# Patient Record
Sex: MALE | Race: WHITE
[De-identification: names, ages, dates, MRNs, and addresses within clinical notes are randomized per-mention and may not be internally consistent; named-entity substitution may affect disease eponyms.]

---

## 2018-03-21 ENCOUNTER — HOSPITAL ENCOUNTER (EMERGENCY)
Dept: HOSPITAL 9 - MADERS | Age: 33
Discharge: HOME | End: 2018-03-21
Payer: SELF-PAY

## 2018-03-21 DIAGNOSIS — F17.210: ICD-10-CM

## 2018-03-21 DIAGNOSIS — J20.9: Primary | ICD-10-CM

## 2018-03-21 PROCEDURE — 99283 EMERGENCY DEPT VISIT LOW MDM: CPT

## 2018-08-26 ENCOUNTER — HOSPITAL ENCOUNTER (EMERGENCY)
Dept: HOSPITAL 9 - MADERS | Age: 33
Discharge: HOME | End: 2018-08-26
Payer: SELF-PAY
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
## 2018-08-26 DIAGNOSIS — F17.210: ICD-10-CM

## 2018-08-26 DIAGNOSIS — S82.61XA: Primary | ICD-10-CM
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
## 2018-10-22 ENCOUNTER — HOSPITAL ENCOUNTER (EMERGENCY)
Dept: HOSPITAL 9 - MADERS | Age: 33
Discharge: HOME | End: 2018-10-22
Payer: SELF-PAY

## 2018-10-22 DIAGNOSIS — M79.18: Primary | ICD-10-CM

## 2018-10-22 DIAGNOSIS — F17.210: ICD-10-CM

## 2018-10-22 PROCEDURE — 99281 EMR DPT VST MAYX REQ PHY/QHP: CPT

## 2018-11-14 ENCOUNTER — HOSPITAL ENCOUNTER (EMERGENCY)
Dept: HOSPITAL 9 - MADERS | Age: 33
Discharge: HOME | End: 2018-11-14
Payer: SELF-PAY

## 2018-11-14 DIAGNOSIS — F17.210: ICD-10-CM

## 2018-11-14 DIAGNOSIS — J20.9: Primary | ICD-10-CM

## 2018-11-14 PROCEDURE — 94640 AIRWAY INHALATION TREATMENT: CPT

## 2018-12-27 ENCOUNTER — HOSPITAL ENCOUNTER (EMERGENCY)
Dept: HOSPITAL 92 - ERS | Age: 33
Discharge: HOME | End: 2018-12-27
Payer: SELF-PAY

## 2018-12-27 DIAGNOSIS — F17.210: ICD-10-CM

## 2018-12-27 DIAGNOSIS — Z23: ICD-10-CM
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
## 2018-12-27 DIAGNOSIS — S01.01XA: ICD-10-CM

## 2018-12-27 DIAGNOSIS — S06.0X9A: Primary | ICD-10-CM

## 2018-12-27 DIAGNOSIS — F14.129: ICD-10-CM
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
## 2018-12-27 DIAGNOSIS — S01.81XA: ICD-10-CM

## 2018-12-27 DIAGNOSIS — F12.929: ICD-10-CM

## 2018-12-27 DIAGNOSIS — S01.112A: ICD-10-CM

## 2018-12-27 LAB
ALBUMIN SERPL BCG-MCNC: 4.7 G/DL (ref 3.5–5)
ALP SERPL-CCNC: 71 U/L (ref 40–150)
ALT SERPL W P-5'-P-CCNC: 31 U/L (ref 8–55)
ANION GAP SERPL CALC-SCNC: 18 MMOL/L (ref 10–20)
APAP SERPL-MCNC: (no result) MCG/ML (ref 10–30)
AST SERPL-CCNC: 35 U/L (ref 5–34)
BASOPHILS # BLD AUTO: 0.1 THOU/UL (ref 0–0.2)
BASOPHILS NFR BLD AUTO: 0.5 % (ref 0–1)
BILIRUB SERPL-MCNC: 0.2 MG/DL (ref 0.2–1.2)
BUN SERPL-MCNC: 16 MG/DL (ref 8.9–20.6)
CALCIUM SERPL-MCNC: 8.9 MG/DL (ref 7.8–10.44)
CHLORIDE SERPL-SCNC: 105 MMOL/L (ref 98–107)
CO2 SERPL-SCNC: 21 MMOL/L (ref 22–29)
CREAT CL PREDICTED SERPL C-G-VRATE: 0 ML/MIN (ref 70–130)
CRYSTAL-AUWI FLAG: 0.1 (ref 0–15)
DRUG SCREEN CUTOFF: (no result)
EOSINOPHIL # BLD AUTO: 0.1 THOU/UL (ref 0–0.7)
EOSINOPHIL NFR BLD AUTO: 0.7 % (ref 0–10)
GLOBULIN SER CALC-MCNC: 2.6 G/DL (ref 2.4–3.5)
GLUCOSE SERPL-MCNC: 98 MG/DL (ref 70–105)
HEV IGM SER QL: 6.8 (ref 0–7.99)
HGB BLD-MCNC: 14.6 G/DL (ref 14–18)
HYALINE CASTS #/AREA URNS LPF: (no result) LPF
LYMPHOCYTES # BLD: 1.5 THOU/UL (ref 1.2–3.4)
LYMPHOCYTES NFR BLD AUTO: 9.4 % (ref 21–51)
MCH RBC QN AUTO: 32.5 PG (ref 27–31)
MCV RBC AUTO: 93.8 FL (ref 78–98)
MEDTOX CONTROL LINE VALID?: (no result)
MEDTOX READER #: (no result)
MONOCYTES # BLD AUTO: 0.6 THOU/UL (ref 0.11–0.59)
MONOCYTES NFR BLD AUTO: 3.7 % (ref 0–10)
NEUTROPHILS # BLD AUTO: 13.4 THOU/UL (ref 1.4–6.5)
NEUTROPHILS NFR BLD AUTO: 85.7 % (ref 42–75)
PATHC CAST-AUWI FLAG: 0.58 (ref 0–2.49)
PLATELET # BLD AUTO: 310 THOU/UL (ref 130–400)
POTASSIUM SERPL-SCNC: 4.2 MMOL/L (ref 3.5–5.1)
PROT UR STRIP.AUTO-MCNC: 100 MG/DL
RBC # BLD AUTO: 4.48 MILL/UL (ref 4.7–6.1)
RBC UR QL AUTO: (no result) HPF (ref 0–3)
SALICYLATES SERPL-MCNC: (no result) MG/DL (ref 15–30)
SODIUM SERPL-SCNC: 140 MMOL/L (ref 136–145)
SP GR UR STRIP: 1.02 (ref 1–1.04)
SPERM-AUWI FLAG: 0 (ref 0–9.9)
WBC # BLD AUTO: 15.7 THOU/UL (ref 4.8–10.8)
WBC UR QL AUTO: (no result) HPF (ref 0–3)
YEAST-AUWI FLAG: 0 (ref 0–25)

## 2018-12-27 PROCEDURE — 85025 COMPLETE CBC W/AUTO DIFF WBC: CPT

## 2018-12-27 PROCEDURE — 96361 HYDRATE IV INFUSION ADD-ON: CPT

## 2018-12-27 PROCEDURE — 12052 INTMD RPR FACE/MM 2.6-5.0 CM: CPT

## 2018-12-27 PROCEDURE — 51701 INSERT BLADDER CATHETER: CPT

## 2018-12-27 PROCEDURE — 90715 TDAP VACCINE 7 YRS/> IM: CPT

## 2018-12-27 PROCEDURE — 96366 THER/PROPH/DIAG IV INF ADDON: CPT

## 2018-12-27 PROCEDURE — 96365 THER/PROPH/DIAG IV INF INIT: CPT

## 2018-12-27 PROCEDURE — 12002 RPR S/N/AX/GEN/TRNK2.6-7.5CM: CPT

## 2018-12-27 PROCEDURE — 70487 CT MAXILLOFACIAL W/DYE: CPT

## 2018-12-27 PROCEDURE — 96374 THER/PROPH/DIAG INJ IV PUSH: CPT

## 2018-12-27 PROCEDURE — 12015 RPR F/E/E/N/L/M 7.6-12.5 CM: CPT

## 2018-12-27 PROCEDURE — 80306 DRUG TEST PRSMV INSTRMNT: CPT

## 2018-12-27 PROCEDURE — 90471 IMMUNIZATION ADMIN: CPT

## 2018-12-27 PROCEDURE — 72125 CT NECK SPINE W/O DYE: CPT

## 2018-12-27 PROCEDURE — 36415 COLL VENOUS BLD VENIPUNCTURE: CPT

## 2018-12-27 PROCEDURE — 81003 URINALYSIS AUTO W/O SCOPE: CPT

## 2018-12-27 PROCEDURE — 81015 MICROSCOPIC EXAM OF URINE: CPT

## 2018-12-27 PROCEDURE — 80307 DRUG TEST PRSMV CHEM ANLYZR: CPT

## 2018-12-27 PROCEDURE — 80053 COMPREHEN METABOLIC PANEL: CPT

## 2018-12-27 PROCEDURE — 70450 CT HEAD/BRAIN W/O DYE: CPT
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
## 2018-12-27 NOTE — CT
CT HEAD NONCONTRAST:

 

DATE: 12/27/2018.

TIME: Performed on an emergency basis at 0835 hours.

 

HISTORY:  

Head injury.

 

FINDINGS: 

There is no evidence of acute intracranial hemorrhage or infarct.  Ventricles appear normal in size, 
shape, and position.  There is no mass effect or shift of midline structures.  Visualized paranasal s
inuses remain well aerated.  Frontal scalp injury is better detailed on separate CT face exam.  

 

IMPRESSION: 

No acute intracranial abnormalities are demonstrated.

 

POS: [facility]

## 2019-01-10 ENCOUNTER — HOSPITAL ENCOUNTER (EMERGENCY)
Dept: HOSPITAL 9 - MADERS | Age: 34
LOS: 1 days | Discharge: HOME | End: 2019-01-11
Payer: SELF-PAY
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
## 2019-01-10 DIAGNOSIS — F17.210: ICD-10-CM

## 2019-01-10 DIAGNOSIS — S13.9XXA: Primary | ICD-10-CM

## 2019-01-10 PROCEDURE — 72050 X-RAY EXAM NECK SPINE 4/5VWS: CPT

## 2019-01-10 NOTE — RAD
CERVICAL SPINE RADIOGRAPHS

1/10/19

 

PROVIDED CLINICAL HISTORY:  

Neck pain .

 

FINDINGS:  

Cervical alignment appears normal. Vertebral body heights and intervertebral disc space heights appea
r preserved. No prevertebral soft tissue swelling apparent. No evidence for fracture. The visualized 
lung apices appear clear. 

 

IMPRESSION:  

No evidence for fracture or subluxation.

 

POS: [facility]

## 2019-01-11 ENCOUNTER — HOSPITAL ENCOUNTER (EMERGENCY)
Dept: HOSPITAL 9 - MADERS | Age: 34
Discharge: HOME | End: 2019-01-11
Payer: SELF-PAY

## 2019-01-11 DIAGNOSIS — F17.210: ICD-10-CM
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
## 2019-01-11 DIAGNOSIS — S16.1XXA: Primary | ICD-10-CM

## 2019-01-11 PROCEDURE — 99283 EMERGENCY DEPT VISIT LOW MDM: CPT

## 2019-01-24 ENCOUNTER — HOSPITAL ENCOUNTER (EMERGENCY)
Dept: HOSPITAL 9 - MADERS | Age: 34
Discharge: HOME | End: 2019-01-24
Payer: SELF-PAY

## 2019-01-24 DIAGNOSIS — M54.10: Primary | ICD-10-CM

## 2019-01-24 DIAGNOSIS — F17.210: ICD-10-CM

## 2019-01-24 PROCEDURE — 99283 EMERGENCY DEPT VISIT LOW MDM: CPT

## 2019-09-18 ENCOUNTER — HOSPITAL ENCOUNTER (EMERGENCY)
Dept: HOSPITAL 9 - MADERS | Age: 34
Discharge: HOME | End: 2019-09-18
Payer: SELF-PAY

## 2019-09-18 DIAGNOSIS — Z71.6: ICD-10-CM

## 2019-09-18 DIAGNOSIS — F17.210: ICD-10-CM

## 2019-09-18 DIAGNOSIS — M62.838: Primary | ICD-10-CM

## 2019-09-18 DIAGNOSIS — F32.9: ICD-10-CM

## 2019-09-18 PROCEDURE — 99406 BEHAV CHNG SMOKING 3-10 MIN: CPT

## 2019-11-23 ENCOUNTER — HOSPITAL ENCOUNTER (EMERGENCY)
Dept: HOSPITAL 9 - MADERS | Age: 34
Discharge: HOME | End: 2019-11-23
Payer: SELF-PAY

## 2019-11-23 DIAGNOSIS — F32.9: ICD-10-CM

## 2019-11-23 DIAGNOSIS — F17.210: ICD-10-CM

## 2019-11-23 DIAGNOSIS — M79.601: Primary | ICD-10-CM

## 2019-11-23 PROCEDURE — 99283 EMERGENCY DEPT VISIT LOW MDM: CPT

## 2019-11-24 ENCOUNTER — HOSPITAL ENCOUNTER (EMERGENCY)
Dept: HOSPITAL 9 - MADERS | Age: 34
Discharge: HOME | End: 2019-11-24
Payer: SELF-PAY

## 2019-11-24 DIAGNOSIS — G56.91: Primary | ICD-10-CM

## 2019-11-24 DIAGNOSIS — F17.210: ICD-10-CM

## 2019-11-24 DIAGNOSIS — F32.9: ICD-10-CM

## 2019-11-24 PROCEDURE — 99283 EMERGENCY DEPT VISIT LOW MDM: CPT

## 2023-09-15 ENCOUNTER — HOSPITAL ENCOUNTER (EMERGENCY)
Dept: HOSPITAL 9 - MADERS | Age: 38
Discharge: PSYCHIATRIC OR SUBSTANCE USE FACILITY | End: 2023-09-15
Payer: COMMERCIAL

## 2023-09-15 DIAGNOSIS — F17.210: ICD-10-CM

## 2023-09-15 DIAGNOSIS — F41.9: Primary | ICD-10-CM

## 2023-09-15 LAB
ALBUMIN SERPL BCG-MCNC: 4.5 G/DL (ref 3.5–5)
ALP SERPL-CCNC: 74 U/L (ref 40–110)
ALT SERPL W P-5'-P-CCNC: 22 U/L (ref 8–55)
ANION GAP SERPL CALC-SCNC: 17 MMOL/L (ref 10–20)
APAP SERPL-MCNC: (no result) MCG/ML (ref 10–30)
AST SERPL-CCNC: 22 U/L (ref 5–34)
BACTERIA UR QL AUTO: (no result) HPF
BASOPHILS # BLD AUTO: 0.1 THOU/UL (ref 0–0.2)
BASOPHILS NFR BLD AUTO: 1.2 % (ref 0–1)
BILIRUB SERPL-MCNC: 0.5 MG/DL (ref 0.2–1.2)
BUN SERPL-MCNC: 13 MG/DL (ref 8.9–20.6)
CALCIUM SERPL-MCNC: 9.5 MG/DL (ref 7.8–10.44)
CAUTI INDICATIONS FOR CULTURE: (no result)
CHLORIDE SERPL-SCNC: 105 MMOL/L (ref 98–107)
CO2 SERPL-SCNC: 22 MMOL/L (ref 22–29)
CREAT CL PREDICTED SERPL C-G-VRATE: 0 ML/MIN (ref 70–130)
DRUG SCREEN CUTOFF: (no result)
EOSINOPHIL # BLD AUTO: 0.1 THOU/UL (ref 0–0.7)
EOSINOPHIL NFR BLD AUTO: 1.7 % (ref 0–10)
GLOBULIN SER CALC-MCNC: 2.6 G/DL (ref 2.4–3.5)
GLUCOSE SERPL-MCNC: 83 MG/DL (ref 70–105)
HCT VFR BLD CALC: 41.6 % (ref 42–52)
HGB BLD-MCNC: 14.7 G/DL (ref 14–18)
LYMPHOCYTES # BLD AUTO: 1.8 THOU/UL (ref 1.2–3.4)
LYMPHOCYTES NFR BLD AUTO: 22.7 % (ref 21–51)
MCH RBC QN AUTO: 33.2 PG (ref 27–31)
MCV RBC AUTO: 93.9 FL (ref 78–98)
MONOCYTES # BLD AUTO: 0.4 THOU/UL (ref 0.11–0.59)
MONOCYTES NFR BLD AUTO: 5.2 % (ref 0–10)
NEUTROPHILS # BLD AUTO: 5.6 THOU/UL (ref 1.4–6.5)
NEUTROPHILS NFR BLD AUTO: 69.2 % (ref 42–75)
PLATELET # BLD AUTO: 318 10X3/UL (ref 130–400)
POTASSIUM SERPL-SCNC: 4.2 MMOL/L (ref 3.5–5.1)
RBC # BLD AUTO: 4.43 MILL/UL (ref 4.7–6.1)
RBC UR QL AUTO: (no result) HPF (ref 0–3)
SALICYLATES SERPL-MCNC: (no result) MG/DL (ref 15–30)
SODIUM SERPL-SCNC: 140 MMOL/L (ref 136–145)
SP GR UR STRIP: 1.01 (ref 1–1.03)
TROPONIN I SERPL DL<=0.01 NG/ML-MCNC: (no result) NG/ML (ref ?–0.03)
WBC # BLD AUTO: 8 10X3/UL (ref 4.8–10.8)
WBC UR QL AUTO: (no result) HPF (ref 0–3)

## 2023-09-15 PROCEDURE — 84484 ASSAY OF TROPONIN QUANT: CPT

## 2023-09-15 PROCEDURE — 36415 COLL VENOUS BLD VENIPUNCTURE: CPT

## 2023-09-15 PROCEDURE — 80053 COMPREHEN METABOLIC PANEL: CPT

## 2023-09-15 PROCEDURE — 85025 COMPLETE CBC W/AUTO DIFF WBC: CPT

## 2023-09-15 PROCEDURE — 84443 ASSAY THYROID STIM HORMONE: CPT

## 2023-09-15 PROCEDURE — 80307 DRUG TEST PRSMV CHEM ANLYZR: CPT

## 2023-09-15 PROCEDURE — 81001 URINALYSIS AUTO W/SCOPE: CPT

## 2023-09-15 PROCEDURE — 93005 ELECTROCARDIOGRAM TRACING: CPT

## 2023-09-15 PROCEDURE — 80306 DRUG TEST PRSMV INSTRMNT: CPT
